# Patient Record
Sex: MALE
[De-identification: names, ages, dates, MRNs, and addresses within clinical notes are randomized per-mention and may not be internally consistent; named-entity substitution may affect disease eponyms.]

---

## 2017-05-19 ENCOUNTER — HOSPITAL ENCOUNTER (EMERGENCY)
Dept: HOSPITAL 14 - H.ER | Age: 27
LOS: 1 days | Discharge: HOME | End: 2017-05-20
Payer: SELF-PAY

## 2017-05-19 DIAGNOSIS — M54.9: Primary | ICD-10-CM

## 2017-05-19 DIAGNOSIS — R31.9: ICD-10-CM

## 2017-05-19 PROCEDURE — 81003 URINALYSIS AUTO W/O SCOPE: CPT

## 2017-05-19 PROCEDURE — 85025 COMPLETE CBC W/AUTO DIFF WBC: CPT

## 2017-05-19 PROCEDURE — 74176 CT ABD & PELVIS W/O CONTRAST: CPT

## 2017-05-19 PROCEDURE — 99282 EMERGENCY DEPT VISIT SF MDM: CPT

## 2017-05-19 PROCEDURE — 96360 HYDRATION IV INFUSION INIT: CPT

## 2017-05-19 PROCEDURE — 87086 URINE CULTURE/COLONY COUNT: CPT

## 2017-05-19 PROCEDURE — 80053 COMPREHEN METABOLIC PANEL: CPT

## 2017-05-20 VITALS
RESPIRATION RATE: 18 BRPM | DIASTOLIC BLOOD PRESSURE: 76 MMHG | TEMPERATURE: 98.3 F | OXYGEN SATURATION: 99 % | HEART RATE: 64 BPM | SYSTOLIC BLOOD PRESSURE: 130 MMHG

## 2017-05-20 LAB
ALBUMIN/GLOB SERPL: 1.6 {RATIO} (ref 1–2.1)
ALP SERPL-CCNC: 77 U/L (ref 38–126)
ALT SERPL-CCNC: 31 U/L (ref 21–72)
AST SERPL-CCNC: 35 U/L (ref 17–59)
BASOPHILS # BLD AUTO: 0.1 K/UL (ref 0–0.2)
BASOPHILS NFR BLD: 0.9 % (ref 0–2)
BILIRUB SERPL-MCNC: 0.3 MG/DL (ref 0.2–1.3)
BILIRUB UR-MCNC: NEGATIVE MG/DL
BUN SERPL-MCNC: 17 MG/DL (ref 9–20)
CALCIUM SERPL-MCNC: 9.4 MG/DL (ref 8.4–10.2)
CHLORIDE SERPL-SCNC: 103 MMOL/L (ref 98–107)
CO2 SERPL-SCNC: 28 MMOL/L (ref 22–30)
COLOR UR: YELLOW
EOSINOPHIL # BLD AUTO: 0.3 K/UL (ref 0–0.7)
EOSINOPHIL NFR BLD: 3.5 % (ref 0–4)
ERYTHROCYTE [DISTWIDTH] IN BLOOD BY AUTOMATED COUNT: 12.7 % (ref 11.5–14.5)
GLOBULIN SER-MCNC: 2.7 GM/DL (ref 2.2–3.9)
GLUCOSE SERPL-MCNC: 108 MG/DL (ref 75–110)
GLUCOSE UR STRIP-MCNC: (no result) MG/DL
HCT VFR BLD CALC: 40.5 % (ref 35–51)
KETONES UR STRIP-MCNC: NEGATIVE MG/DL
LEUKOCYTE ESTERASE UR-ACNC: (no result) LEU/UL
LYMPHOCYTES # BLD AUTO: 2.9 K/UL (ref 1–4.3)
LYMPHOCYTES NFR BLD AUTO: 33.7 % (ref 20–40)
MCH RBC QN AUTO: 28.9 PG (ref 27–31)
MCHC RBC AUTO-ENTMCNC: 34.3 G/DL (ref 33–37)
MCV RBC AUTO: 84.2 FL (ref 80–94)
MONOCYTES # BLD: 0.8 K/UL (ref 0–0.8)
MONOCYTES NFR BLD: 9.4 % (ref 0–10)
NEUTROPHILS # BLD: 4.5 K/UL (ref 1.8–7)
NEUTROPHILS NFR BLD AUTO: 52.5 % (ref 50–75)
NRBC BLD AUTO-RTO: 0 % (ref 0–0)
PH UR STRIP: 5 [PH] (ref 5–8)
PLATELET # BLD: 283 K/UL (ref 130–400)
PMV BLD AUTO: 8 FL (ref 7.2–11.7)
POTASSIUM SERPL-SCNC: 3.6 MMOL/L (ref 3.6–5)
PROT SERPL-MCNC: 7.2 G/DL (ref 6.3–8.2)
PROT UR STRIP-MCNC: NEGATIVE MG/DL
RBC # UR STRIP: NEGATIVE /UL
RBC #/AREA URNS HPF: 5 /HPF (ref 0–3)
SODIUM SERPL-SCNC: 139 MMOL/L (ref 132–148)
SP GR UR STRIP: 1.03 (ref 1–1.03)
UROBILINOGEN UR-MCNC: (no result) MG/DL (ref 0.2–1)
WBC # BLD AUTO: 8.6 K/UL (ref 4.8–10.8)
WBC #/AREA URNS HPF: 7 /HPF (ref 0–5)

## 2017-05-20 NOTE — ED PDOC
HPI: Back


Time Seen by Provider: 17 00:28


Chief Complaint (Nursing): Back Pain


Chief Complaint (Provider): back pain


History Per: Patient


Additional Complaint(s): 


27-year-old male with no past medical history presents to emergency Department 

with pain to lower back that started 2 days ago. Patient denies injury or 

trauma. No fever or chills. No dysuria or hematuria. No medication taken for 

pain relief. 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 

a regular basis. 





Past Medical History


Reviewed: Historical Data, Nursing Documentation, Vital Signs


Vital Signs: 


 Last Vital Signs











Temp  98.3 F   17 00:19


 


Pulse  64   17 00:19


 


Resp  18   17 00:19


 


BP  130/76   17 00:19


 


Pulse Ox  99   17 00:19














- Medical History


PMH: No Chronic Diseases





- Surgical History


Surgical History: No Surg Hx





- Family History


Family History: States: No Known Family Hx





- Living Arrangements


Living Arrangements: With Family





- Social History


Current smoker - smoking cessation education provided: No


Alcohol: None


Drugs: Denies





- Home Medications


Home Medications: 


 Ambulatory Orders











 Medication  Instructions  Recorded


 


Cyclobenzaprine [Cyclobenzaprine 10 mg PO TID PRN #15 tab 17





HCl]  


 


Ibuprofen [Motrin] 600 mg PO Q6 PRN #15 tab 17














- Allergies


Allergies/Adverse Reactions: 


 Allergies











Allergy/AdvReac Type Severity Reaction Status Date / Time


 


No Known Allergies Allergy   Verified 17 00:19














Review of Systems


ROS Statement: Except As Marked, All Systems Reviewed And Found Negative


Constitutional: Negative for: Fever


Cardiovascular: Negative for: Chest Pain


Respiratory: Negative for: Cough


Gastrointestinal: Negative for: Nausea, Vomiting, Abdominal Pain


Genitourinary Male: Negative for: Dysuria, Frequency, Incontinence, Hematuria


Musculoskeletal: Positive for: Back Pain


Neurological: Negative for: Weakness, Numbness, Incoordination, Change in Speech

, Altered Mental Status, Headache, Dizziness





Physical Exam





- Reviewed


Nursing Documentation Reviewed: Yes


Vital Signs Reviewed: Yes





- Physical Exam


Appears: Positive for: Well, Non-toxic, No Acute Distress


Skin: Negative for: Rash


Eye Exam: Positive for: Normal appearance, EOMI, PERRL


Cardiovascular/Chest: Positive for: Regular Rate, Rhythm


Respiratory: Positive for: Normal Breath Sounds.  Negative for: Respiratory 

Distress


Gastrointestinal/Abdominal: Positive for: Normal Exam, Soft.  Negative for: 

Tenderness


Back: Positive for: L CVA Tenderness, R CVA Tenderness, Vertebral Tenderness (

lumbar region), Other (negative bilateral straight leg raise)


Extremity: Positive for: Normal ROM.  Negative for: Pedal Edema


Neurologic/Psych: Positive for: Alert, Oriented





- Laboratory Results


Result Diagrams: 


 17 01:54





 17 01:54


Urine dip results: Positive for: Blood (moderate).  Negative for: Leukocyte 

Esterase, Nitrate, Ketones, Glucose, Bilirubin, Protein





- ECG


O2 Sat by Pulse Oximetry: 99


Pulse Ox Interpretation: Normal





- Other Rad


  ** CT abd and pelvis without contrast


X-Ray: Read By Radiologist


X-Ray Interpretation: no acute finding, see below





Medical Decision Making


Medical Decision Makin year old with back pain





Urine dip shows moderate blood, will order CT to rule out renal pathology





Plan:


CBC


CMP


IVF


IV toradol


CT abd and pelvis without contrast





CT:  IMPRESSION: - No evidence of significant acute process on this unenhanced 

exam. There is no evidence of nephrolithiasis or obstructive uropathy.


- An abnormal, bandlike area of soft tissue is seen in the right ischiorectal 

fossa laterally, which appears to connect with the demond of the penis on the 

right. This is of uncertain etiology, but could represent some type of 

congenital anomaly. It does not have a CT appearance to suggest an abscess or 

other acute inflammatory process. Recommend clinical correlation. Nonemergent 

pelvic MRI may be helpful for further evaluation. 





Patient states pain is resolved after toradol was given.  He states he feels 

much better. CT is negative for acute finding.  Patient aware of incidental 

finding.  All questions answered.  Will d/c with rx motrin and flexeril.  

Patient was referred to clinic for follow up.  





Disposition





- Clinical Impression


Clinical Impression: 


 Back strain








- Patient ED Disposition


Is Patient to be Admitted: No


Counseled Patient/Family Regarding: Studies Performed, Diagnosis, Need For 

Followup, Rx Given





- Disposition


Referrals: 


[facility] [Outside]


Disposition: Routine/Home


Disposition Time: 04:21


Condition: STABLE


Additional Instructions: 


Take rx meds as directed as needed for pain.  Avoid heavy lifting.  Follow up 

in 2-3 days with clinic.  


Prescriptions: 


Cyclobenzaprine [Cyclobenzaprine HCl] 10 mg PO TID PRN #15 tab


 PRN Reason: Muscle Pain


Ibuprofen [Motrin] 600 mg PO Q6 PRN #15 tab


 PRN Reason: Pain, Moderate (4-7)


Instructions:  Back Pain (ED), Muscle Strain (ED)


Print Language: Hebrew





Results





- Lab Results


Lab Results: 














  17





  01:54 01:54 01:54


 


WBC    8.6


 


RBC    4.82


 


Hgb    13.9


 


Hct    40.5


 


MCV    84.2


 


MCH    28.9


 


MCHC    34.3


 


RDW    12.7


 


Plt Count    283


 


MPV    8.0


 


Neut % (Auto)    52.5


 


Lymph % (Auto)    33.7


 


Mono % (Auto)    9.4


 


Eos % (Auto)    3.5


 


Baso % (Auto)    0.9


 


Neut #    4.5


 


Lymph #    2.9


 


Mono #    0.8


 


Eos #    0.3


 


Baso #    0.1


 


Sodium   139 


 


Potassium   3.6 


 


Chloride   103 


 


Carbon Dioxide   28 


 


Anion Gap   12 


 


BUN   17 


 


Creatinine   0.8 


 


Est GFR ( Amer)   > 60 


 


Est GFR (Non-Af Amer)   > 60 


 


Random Glucose   108 


 


Calcium   9.4 


 


Total Bilirubin   0.3 


 


AST   35 


 


ALT   31 


 


Alkaline Phosphatase   77 


 


Total Protein   7.2 


 


Albumin   4.5 


 


Globulin   2.7 


 


Albumin/Globulin Ratio   1.6 


 


Urine Color  Yellow  


 


Urine Clarity  Clear  


 


Urine pH  5.0  


 


Ur Specific Gravity  1.033 H  


 


Urine Protein  Negative  


 


Urine Glucose (UA)  Neg  


 


Urine Ketones  Negative  


 


Urine Blood  Negative  


 


Urine Nitrate  Negative  


 


Urine Bilirubin  Negative  


 


Urine Urobilinogen  0.2-1.0  


 


Ur Leukocyte Esterase  Trace  


 


Urine RBC (Auto)  5 H  


 


Urine Microscopic WBC  7 H

## 2018-06-10 ENCOUNTER — HOSPITAL ENCOUNTER (OUTPATIENT)
Dept: HOSPITAL 14 - H.ER | Age: 28
Setting detail: OBSERVATION
LOS: 1 days | Discharge: HOME | End: 2018-06-11
Attending: INTERNAL MEDICINE | Admitting: INTERNAL MEDICINE
Payer: SELF-PAY

## 2018-06-10 DIAGNOSIS — D72.828: ICD-10-CM

## 2018-06-10 DIAGNOSIS — R51: ICD-10-CM

## 2018-06-10 DIAGNOSIS — R04.0: ICD-10-CM

## 2018-06-10 DIAGNOSIS — R93.0: Primary | ICD-10-CM

## 2018-06-10 PROCEDURE — 99285 EMERGENCY DEPT VISIT HI MDM: CPT

## 2018-06-10 PROCEDURE — 86850 RBC ANTIBODY SCREEN: CPT

## 2018-06-10 PROCEDURE — 85027 COMPLETE CBC AUTOMATED: CPT

## 2018-06-10 PROCEDURE — 70450 CT HEAD/BRAIN W/O DYE: CPT

## 2018-06-10 PROCEDURE — 86900 BLOOD TYPING SEROLOGIC ABO: CPT

## 2018-06-10 PROCEDURE — 85610 PROTHROMBIN TIME: CPT

## 2018-06-10 PROCEDURE — 93005 ELECTROCARDIOGRAM TRACING: CPT

## 2018-06-10 PROCEDURE — 85730 THROMBOPLASTIN TIME PARTIAL: CPT

## 2018-06-10 PROCEDURE — 85025 COMPLETE CBC W/AUTO DIFF WBC: CPT

## 2018-06-10 PROCEDURE — 80053 COMPREHEN METABOLIC PANEL: CPT

## 2018-06-10 PROCEDURE — 70553 MRI BRAIN STEM W/O & W/DYE: CPT

## 2018-06-11 VITALS
HEART RATE: 89 BPM | SYSTOLIC BLOOD PRESSURE: 122 MMHG | DIASTOLIC BLOOD PRESSURE: 82 MMHG | OXYGEN SATURATION: 96 % | TEMPERATURE: 98.2 F

## 2018-06-11 VITALS — RESPIRATION RATE: 20 BRPM

## 2018-06-11 LAB
ALBUMIN SERPL-MCNC: 4 G/DL (ref 3.5–5)
ALBUMIN/GLOB SERPL: 1.2 {RATIO} (ref 1–2.1)
ALT SERPL-CCNC: 48 U/L (ref 21–72)
APTT BLD: 30.5 SECONDS (ref 25.6–37.1)
AST SERPL-CCNC: 38 U/L (ref 17–59)
BASOPHILS # BLD AUTO: 0 K/UL (ref 0–0.2)
BASOPHILS NFR BLD: 0.3 % (ref 0–2)
BUN SERPL-MCNC: 11 MG/DL (ref 9–20)
CALCIUM SERPL-MCNC: 9 MG/DL (ref 8.4–10.2)
EOSINOPHIL # BLD AUTO: 0.1 K/UL (ref 0–0.7)
EOSINOPHIL NFR BLD: 1.2 % (ref 0–4)
ERYTHROCYTE [DISTWIDTH] IN BLOOD BY AUTOMATED COUNT: 12.8 % (ref 11.5–14.5)
ERYTHROCYTE [DISTWIDTH] IN BLOOD BY AUTOMATED COUNT: 13.1 % (ref 11.5–14.5)
GFR NON-AFRICAN AMERICAN: > 60
HGB BLD-MCNC: 14.3 G/DL (ref 12–18)
HGB BLD-MCNC: 14.4 G/DL (ref 12–18)
INR PPP: 1 (ref 0.9–1.2)
LYMPHOCYTES # BLD AUTO: 1.6 K/UL (ref 1–4.3)
LYMPHOCYTES NFR BLD AUTO: 13.4 % (ref 20–40)
MCH RBC QN AUTO: 28.3 PG (ref 27–31)
MCH RBC QN AUTO: 28.4 PG (ref 27–31)
MCHC RBC AUTO-ENTMCNC: 33.2 G/DL (ref 33–37)
MCHC RBC AUTO-ENTMCNC: 33.5 G/DL (ref 33–37)
MCV RBC AUTO: 84.6 FL (ref 80–94)
MCV RBC AUTO: 85.4 FL (ref 80–94)
MONOCYTES # BLD: 1.2 K/UL (ref 0–0.8)
MONOCYTES NFR BLD: 9.8 % (ref 0–10)
NEUTROPHILS # BLD: 9.2 K/UL (ref 1.8–7)
NEUTROPHILS NFR BLD AUTO: 75.3 % (ref 50–75)
NRBC BLD AUTO-RTO: 0 % (ref 0–0)
PLATELET # BLD: 319 K/UL (ref 130–400)
PLATELET # BLD: 323 K/UL (ref 130–400)
PMV BLD AUTO: 7.4 FL (ref 7.2–11.7)
PROTHROMBIN TIME: 10.5 SECONDS (ref 9.8–13.1)
RBC # BLD AUTO: 5.05 MIL/UL (ref 4.4–5.9)
RBC # BLD AUTO: 5.06 MIL/UL (ref 4.4–5.9)
WBC # BLD AUTO: 12.2 K/UL (ref 4.8–10.8)
WBC # BLD AUTO: 13.9 K/UL (ref 4.8–10.8)

## 2018-06-11 NOTE — CP.PCM.DIS
Provider





- Provider


Date of Admission: 


06/11/18 01:09





Attending physician: 


Arnold Coon





Consults: 





Neurosurgery: DR English


Time Spent in preparation of Discharge (in minutes): 30





Diagnosis





- Discharge Diagnosis


(1) Abnormal finding on CT scan


Status: Acute   





(2) Headache


Status: Acute   





Hospital Course





- Lab Results


Lab Results: 


 Most Recent Lab Values











WBC  13.9 K/uL (4.8-10.8)  H  06/11/18  04:20    


 


RBC  5.06 Mil/uL (4.40-5.90)   06/11/18  04:20    


 


Hgb  14.4 g/dL (12.0-18.0)   06/11/18  04:20    


 


Hct  43.2 % (35.0-51.0)   06/11/18  04:20    


 


MCV  85.4 fl (80.0-94.0)   06/11/18  04:20    


 


MCH  28.4 pg (27.0-31.0)   06/11/18  04:20    


 


MCHC  33.2 g/dL (33.0-37.0)   06/11/18  04:20    


 


RDW  13.1 % (11.5-14.5)   06/11/18  04:20    


 


Plt Count  323 K/uL (130-400)   06/11/18  04:20    


 


MPV  7.4 fl (7.2-11.7)   06/11/18  00:52    


 


Neut % (Auto)  75.3 % (50.0-75.0)  H  06/11/18  00:52    


 


Lymph % (Auto)  13.4 % (20.0-40.0)  L  06/11/18  00:52    


 


Mono % (Auto)  9.8 % (0.0-10.0)   06/11/18  00:52    


 


Eos % (Auto)  1.2 % (0.0-4.0)   06/11/18  00:52    


 


Baso % (Auto)  0.3 % (0.0-2.0)   06/11/18  00:52    


 


Neut # (Auto)  9.2 K/uL (1.8-7.0)  H  06/11/18  00:52    


 


Lymph # (Auto)  1.6 K/uL (1.0-4.3)   06/11/18  00:52    


 


Mono # (Auto)  1.2 K/uL (0.0-0.8)  H  06/11/18  00:52    


 


Eos # (Auto)  0.1 K/uL (0.0-0.7)   06/11/18  00:52    


 


Baso # (Auto)  0.0 K/uL (0.0-0.2)   06/11/18  00:52    


 


PT  10.5 Seconds (9.8-13.1)   06/11/18  00:52    


 


INR  1.0  (0.9-1.2)   06/11/18  00:52    


 


APTT  30.5 Seconds (25.6-37.1)   06/11/18  00:52    


 


Sodium  139 mmol/l (132-148)   06/11/18  00:52    


 


Potassium  4.4 MMOL/L (3.6-5.0)   06/11/18  00:52    


 


Chloride  102 mmol/L ()   06/11/18  00:52    


 


Carbon Dioxide  27 mmol/L (22-30)   06/11/18  00:52    


 


Anion Gap  14  (10-20)   06/11/18  00:52    


 


BUN  11 mg/dl (9-20)   06/11/18  00:52    


 


Creatinine  0.8 mg/dl (0.8-1.5)   06/11/18  00:52    


 


Est GFR ( Amer)  > 60   06/11/18  00:52    


 


Est GFR (Non-Af Amer)  > 60   06/11/18  00:52    


 


Random Glucose  110 mg/dL ()   06/11/18  00:52    


 


Calcium  9.0 mg/dL (8.4-10.2)   06/11/18  00:52    


 


Total Bilirubin  0.6 mg/dl (0.2-1.3)   06/11/18  00:52    


 


AST  38 U/L (17-59)   06/11/18  00:52    


 


ALT  48 U/L (21-72)   06/11/18  00:52    


 


Alkaline Phosphatase  114 U/L ()   06/11/18  00:52    


 


Total Protein  7.3 G/DL (6.3-8.2)   06/11/18  00:52    


 


Albumin  4.0 g/dL (3.5-5.0)   06/11/18  00:52    


 


Globulin  3.2 gm/dL (2.2-3.9)   06/11/18  00:52    


 


Albumin/Globulin Ratio  1.2  (1.0-2.1)   06/11/18  00:52    


 


Blood Type  O POSITIVE   06/11/18  00:52    


 


Antibody Screen  Negative   06/11/18  00:52    


 


BBK History Checked  No verified bt   06/11/18  00:52    














- Hospital Course


Hospital Course: 





28 years old  male with no significant past medial Hx, comes with 2 

days of a continued Occipital headache that is continuous. No hx of Trauma, 

Nausea nor vomits. He did have nose bleed twice during the period of the 

headache however this resolved.    No chest pain nor SOB. He refers no nausea, 

vomits, diarrhea nor constipation. No problem with the Vision.   No neurologic 

deficit.  


CT of the Head: - 5 mm round hyperdense lesion in the right occipital lobe. In 

a patient of this age, this could


be secondary to a vascular malformation, such as cavernous malformation or 

developmental


venous anomaly. A tiny focus of acute hemorrhage is not excluded. MRI of the 

brain would be


helpful for further evaluation.


MRI Brain : Medial Right Occipital lobe lesion likely a small granuloma or 

Cavernoma.





#. Headache .


-Observed in Telemetry


- Neuro checks - no abnormal


- no signs of increase ICP


- no focal neuro deficit


-Tylenol for Pain


-Headache resolved


- advised pt to return if sxs recurs or if with fever , neuro deficit





#. Abnormal Head CT


- Dr Wells consulted and requested the MRI


- MRI  with and without contrast: prob Granuloma or Cavernoma


- discussed case with Dr English - including MRI result - cleared pt for 

discharge home





#. Reactive Leukocytis


- pt has no fever, no signs of infection





#. Epistaxis resolved 





#. DVT prophylaxis with SCD





Discharge Exam





- Head Exam


Head Exam: ATRAUMATIC, NORMAL INSPECTION, NORMOCEPHALIC





- Eye Exam


Eye Exam: EOMI, Normal appearance, PERRL


Pupil Exam: NORMAL ACCOMODATION





- ENT Exam


ENT Exam: Mucous Membranes Moist, Normal External Ear Exam





- Neck Exam


Neck exam: Full Rom


Additional comments: 





no nuchal rigidity





- Respiratory Exam


Respiratory Exam: NORMAL BREATHING PATTERN.  absent: Rales, Wheezes, 

Respiratory Distress





- Cardiovascular Exam


Cardiovascular Exam: REGULAR RHYTHM, +S1, +S2





- GI/Abdominal Exam


GI & Abdominal Exam: Normal Bowel Sounds, Soft.  absent: Tenderness





- Extremities Exam


Extremities exam: full ROM, normal capillary refill, pedal pulses present





- Back Exam


Back exam: FULL ROM.  absent: CVA tenderness (L), CVA tenderness (R)





- Neurological Exam


Neurological exam: Alert, CN II-XII Intact, Normal Gait, Oriented x3, Reflexes 

Normal





- Psychiatric Exam


Psychiatric exam: Normal Affect, Normal Mood





- Skin


Skin Exam: Dry, Normal Color, Warm





Discharge Plan





- Discharge Medications


Prescriptions: 


Acetaminophen [Tylenol 325mg tab] 650 mg PO TID PRN #1 tab


 PRN Reason: Headache





- Follow Up Plan


Condition: IMPROVED


Disposition: HOME/ ROUTINE


Instructions:  Headache, Adult (DC)


Additional Instructions: 


hacer claudia en la clinica en reggie semana


Referrals: 


[facility] [place] [Outside]

## 2018-06-11 NOTE — CP.PCM.HP
History of Present Illness





- History of Present Illness


History of Present Illness: 


PMD: none





Chief complaint: Headache





The patient was seen and examined in the ED





HPI: 28 years old  male with no significant past medicl Hx, comes with 

2 days of a continued Occipital headache that is continuous. No hx of Trauma, 

Nausea nor vomits. He did have nose bleed twice during the period of the 

headache. No chest pain nor SOB. He refers no nausea, vomits, diarrhea nor 

constipation. No problem with the Vision.








PMH: No chronic disease





PSH: No surgeries





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 

use; works in construction





FH: States: No Known Family Hx





Allergies: NKDA





Medication: Reviewed











Present on Admission





- Present on Admission


Any Indicators Present on Admission: No


History of DVT/PE: No


History of Uncontrolled Diabetes: No


Urinary Catheter: No


Decubitus Ulcer Present: No





Review of Systems





- Constitutional


Constitutional: Fever, Headache.  absent: Anorexia, Chills





- EENT


Eyes: absent: Blind Spots, Diplopia, Itchy Eyes, Requires Corrective Lenses, 

Sees Flashes


Ears: absent: Decreased Hearing, Ear Discharge, Tinnitus


Nose/Mouth/Throat: Epistaxis, Nasal Congestion





- Cardiovascular


Cardiovascular: absent: Chest Pain, Dyspnea, Edema





- Respiratory


Respiratory: Cough, Excessive Mucous Production.  absent: Dyspnea, Chest 

Congestion





- Gastrointestinal


Gastrointestinal: absent: Constipation, Diarrhea, Nausea, Vomiting





- Genitourinary


Genitourinary: absent: Dysuria, Flank Pain, Hematuria





- Musculoskeletal


Musculoskeletal: absent: Arthralgias, Loss of Height, Muscle Cramps, Myalgias, 

Neck Pain





- Integumentary


Integumentary: absent: Pruritus, Rash, Skin Ulcer, Sores, Striae, Swelling





- Neurological


Neurological: Headaches.  absent: Confusion, Dizziness





- Psychiatric


Psychiatric: absent: Anxiety, Depression, Hopelessness, Paranoia





- Endocrine


Endocrine: absent: Palpitations, Polydipsia, Polyphagia, Polyuria





- Hematologic/Lymphatic


Hematologic: absent: Easy Bleeding, Easy Bruising





Past Patient History





- Past Medical History & Family History


Past Medical History?: No





- Past Social History


Smoking Status: Never Smoked


Chewing Tobacco Use: No


Cigar Use: No


Alcohol: Occasional


Home Situation {Lives}: With Family





- CARDIAC


Hx Cardiac Disorders: No





- PULMONARY


Hx Respiratory Disorders: No





- NEUROLOGICAL


Hx Neurological Disorder: No





- HEENT


Hx HEENT Problems: No





- RENAL


Hx Chronic Kidney Disease: No


Hx Kidney Stones: No





- ENDOCRINE/METABOLIC


Hx Endocrine Disorders: No





- HEMATOLOGICAL/ONCOLOGICAL


Hx Blood Disorders: No





- INTEGUMENTARY


Hx Dermatological Problems: No





- MUSCULOSKELETAL/RHEUMATOLOGICAL


Hx Musculoskeletal Disorders: No





- GASTROINTESTINAL


Hx Gastrointestinal Disorders: No





- PSYCHIATRIC


Hx Substance Use: No





- SURGICAL HISTORY


Hx Surgeries: No





- ANESTHESIA


Hx Anesthesia: No





Meds


Allergies/Adverse Reactions: 


 Allergies











Allergy/AdvReac Type Severity Reaction Status Date / Time


 


No Known Allergies Allergy   Verified 06/11/18 02:28














Physical Exam





- Constitutional


Appears: No Acute Distress





- Head Exam


Head Exam: ATRAUMATIC, NORMAL INSPECTION, NORMOCEPHALIC





- Eye Exam


Eye Exam: EOMI, Normal appearance


Pupil Exam: NORMAL ACCOMODATION, PERRL





- ENT Exam


ENT Exam: Mucous Membranes Moist, Normal Exam, Normal External Ear Exam





- Neck Exam


Neck exam: Positive for: Full Rom, Normal Inspection.  Negative for: 

Lymphadenopathy, Tenderness





- Respiratory Exam


Respiratory Exam: Clear to Auscultation Bilateral.  absent: Rales, Rhonchi, 

Wheezes





- Cardiovascular Exam


Cardiovascular Exam: Gallop, REGULAR RHYTHM, RRR, +S1, +S2.  absent: JVD





- GI/Abdominal Exam


GI & Abdominal Exam: Normal Bowel Sounds, Soft.  absent: Organomegaly, 

Tenderness





- Rectal Exam


Rectal Exam: Deferred





- Extremities Exam


Extremities exam: Positive for: full ROM, normal inspection.  Negative for: 

pedal edema, tenderness





- Back Exam


Back exam: NORMAL INSPECTION.  absent: CVA tenderness (L), CVA tenderness (R)





- Neurological Exam


Neurological exam: Alert, CN II-XII Intact, Oriented x3, Reflexes Normal





- Psychiatric Exam


Psychiatric exam: Normal Affect, Normal Mood





- Skin


Skin Exam: Dry, Intact, Normal Color, Warm





Results





- Vital Signs


Recent Vital Signs: 





 Last Vital Signs











Temp  99.0 F   06/10/18 23:35


 


Pulse  88   06/10/18 23:35


 


Resp  16   06/10/18 23:35


 


BP  145/88   06/10/18 23:35


 


Pulse Ox  99   06/11/18 00:36














- Labs


Result Diagrams: 


 06/11/18 00:52





 06/11/18 00:52


Labs: 





 Laboratory Results - last 24 hr











  06/11/18 06/11/18 06/11/18





  00:52 00:52 00:52


 


WBC  12.2 H  


 


RBC  5.05  


 


Hgb  14.3  


 


Hct  42.7  


 


MCV  84.6  


 


MCH  28.3  


 


MCHC  33.5  


 


RDW  12.8  


 


Plt Count  319  


 


MPV  7.4  


 


Neut % (Auto)  75.3 H  


 


Lymph % (Auto)  13.4 L  


 


Mono % (Auto)  9.8  


 


Eos % (Auto)  1.2  


 


Baso % (Auto)  0.3  


 


Neut # (Auto)  9.2 H  


 


Lymph # (Auto)  1.6  


 


Mono # (Auto)  1.2 H  


 


Eos # (Auto)  0.1  


 


Baso # (Auto)  0.0  


 


PT    10.5


 


INR    1.0


 


APTT    30.5


 


Sodium   139 


 


Potassium   4.4 


 


Chloride   102 


 


Carbon Dioxide   27 


 


Anion Gap   14 


 


BUN   11 


 


Creatinine   0.8 


 


Est GFR ( Amer)   > 60 


 


Est GFR (Non-Af Amer)   > 60 


 


Random Glucose   110 


 


Calcium   9.0 


 


Total Bilirubin   0.6 


 


AST   38 


 


ALT   48 


 


Alkaline Phosphatase   114 


 


Total Protein   7.3 


 


Albumin   4.0 


 


Globulin   3.2 


 


Albumin/Globulin Ratio   1.2 


 


Blood Type   


 


Antibody Screen   


 


BBK History Checked   














  06/11/18





  00:52


 


WBC 


 


RBC 


 


Hgb 


 


Hct 


 


MCV 


 


MCH 


 


MCHC 


 


RDW 


 


Plt Count 


 


MPV 


 


Neut % (Auto) 


 


Lymph % (Auto) 


 


Mono % (Auto) 


 


Eos % (Auto) 


 


Baso % (Auto) 


 


Neut # (Auto) 


 


Lymph # (Auto) 


 


Mono # (Auto) 


 


Eos # (Auto) 


 


Baso # (Auto) 


 


PT 


 


INR 


 


APTT 


 


Sodium 


 


Potassium 


 


Chloride 


 


Carbon Dioxide 


 


Anion Gap 


 


BUN 


 


Creatinine 


 


Est GFR ( Amer) 


 


Est GFR (Non-Af Amer) 


 


Random Glucose 


 


Calcium 


 


Total Bilirubin 


 


AST 


 


ALT 


 


Alkaline Phosphatase 


 


Total Protein 


 


Albumin 


 


Globulin 


 


Albumin/Globulin Ratio 


 


Blood Type  O POSITIVE


 


Antibody Screen  Negative


 


BBK History Checked  No verified bt














- Imaging and Cardiology


  ** CT scan - head


Status: Image reviewed by me


Additional comment: 





Initial Report created on 6/11/2018 12:25 AM Eastern Time ([place] & [place])


EXAM:


CT Head Without Intravenous Contrast





FINDINGS:


BRAIN: Best seen on image 35 of series 602, there is a small 5 x 5 mm round 

focus of hyperdensity


in the right occipital lobe, centered at the corticomedullary junction, which 

has a CT attenuation of 60


Hounsfield units.


Otherwise, no evidence of a significant acute intracranial process. No evidence 

of diffuse intracranial


lesions by CT. No acute extra-axial fluid collections visualized. No evidence 

of significant mass


effect within the brain.


VENTRICLES: No evidence of significant hydrocephalus.


BONES/JOINTS: No acute fractures or other acute bony abnormality noted.


SOFT TISSUES: No acute abnormality of the visualized soft tissues is seen.


SINUSES: Visualized paranasal sinuses appear clear.


MASTOID AIR CELLS: Mastoid air cells appear clear.





IMPRESSION:


- 5 mm round hyperdense lesion in the right occipital lobe. In a patient of 

this age, this could


be secondary to a vascular malformation, such as cavernous malformation or 

developmental


venous anomaly. A tiny focus of acute hemorrhage is not excluded. MRI of the 

brain would be


helpful for further evaluation.


- See above for remaining findings.





Assessment & Plan





- Assessment and Plan (Free Text)


Assessment: 





#. Headache


#. Abnormal Head CT


#. Leukocytois


Plan: 


28 years old  male with no significant past medial Hx, comes with 2 

days of a continued Occipital headache that is continuous. No hx of Trauma, 

Nausea nor vomits. He did have nose bleed twice during the period of the 

headache. No chest pain nor SOB. He refers no nausea, vomits, diarrhea nor 

constipation. No problem with the Vision.





#. Headache at Occipital region could be related to poor vision. CT of head 

with Questionable 5mm round hyperdense lesion at right Occipital lobe.


-Observe in Telemetry


- Neuro checks


-Tylenol for Pain





#. Abnormal Head CT


- Dr Wells consulted and requested the MRI


- MRI  with and without contrast. If negative patient may be discharged with 

pain management





#. Reactive Leukocytois


- follow WBC





#. Epistaxis Stable





#. DVT prophylaxis with SCD





#. Code Status: Full





- Date & Time


Date: 06/11/18


Time: 02:08

## 2018-06-11 NOTE — ED PDOC
HPI:  Headache


Time Seen by Provider: 06/11/18 00:04


Chief Complaint (Nursing): Headache


Chief Complaint (Provider): HEADACHE


History Per: Patient (27 Y/O MALE HERE WITH HEADACHE NOTED YESTERDAY AND TODAY.

  PATIENT NOTES NOSEBLEED TODAY AND IS CONCERNED.)





Past Medical History


Reviewed: Historical Data, Nursing Documentation, Vital Signs


Vital Signs: 





 Last Vital Signs











Temp  99.0 F   06/10/18 23:35


 


Pulse  88   06/10/18 23:35


 


Resp  16   06/10/18 23:35


 


BP  145/88   06/10/18 23:35


 


Pulse Ox  99   06/10/18 23:35














- Family History


Family History: States: No Known Family Hx





- Home Medications


Home Medications: 


 Ambulatory Orders











 Medication  Instructions  Recorded


 


Cyclobenzaprine [Cyclobenzaprine 10 mg PO TID PRN #15 tab 05/20/17





HCl]  


 


Ibuprofen [Motrin] 600 mg PO Q6 PRN #15 tab 05/20/17














- Allergies


Allergies/Adverse Reactions: 


 Allergies











Allergy/AdvReac Type Severity Reaction Status Date / Time


 


No Known Allergies Allergy   Verified 05/20/17 00:19














Review of Systems


ROS Statement: Except As Marked, All Systems Reviewed And Found Negative





Physical Exam





- Reviewed


Nursing Documentation Reviewed: Yes


Vital Signs Reviewed: Yes





- Physical Exam


Appears: Positive for: Well, Non-toxic, No Acute Distress


Head Exam: Positive for: ATRAUMATIC, NORMAL INSPECTION, NORMOCEPHALIC


Skin: Positive for: Normal Color, Warm, DRY


Eye Exam: Positive for: EOMI, Normal appearance, PERRL


ENT: Positive for: Normal ENT Inspection


Neck: Positive for: Normal, Painless ROM


Cardiovascular/Chest: Positive for: Regular Rate, Rhythm


Respiratory: Positive for: CNT, Normal Breath Sounds


Gastrointestinal/Abdominal: Positive for: Normal Exam, Soft


Back: Positive for: Normal Inspection


Extremity: Positive for: Normal ROM


Neurologic/Psych: Positive for: Alert, Oriented





- Laboratory Results


Result Diagrams: 


 06/11/18 00:52





 06/11/18 00:52





- ECG


O2 Sat by Pulse Oximetry: 99





- Progress


ED Course And Treament: 








D/W [facility] RADIOLOGIST ABNORMAL CT FINDINGS:





 IMPRESSION:       


 - 5 mm round hyperdense lesion in the right occipital lobe. In a patient of   


 this age, this could be secondary to a vascular malformation, such as 

cavernous   


 malformation or developmental venous anomaly. A tiny focus of acute hemorrhage

   


 is not excluded. MRI of the brain would be helpful for further evaluation.  


 - See above for remaining findings.  


 


                                                            Dictated By:  

China Bianchi MD      


                                                            Dictated Date/Time:

  06/11/18 0025  


                                                            Signed By: China Bianchi MD  


                                                            Date Signed: 06/11/ 18 0025  


                                                Transcribed By: YOCASTA  


                                                            Transcribe Date/Time

: 06/11/18 0025  








d/w Dr. English at 00:40. No immediate intervention recommended.





d/w Dr. Coon.  Will admit for MRI in am.





Disposition





- Clinical Impression


Clinical Impression: 


 Abnormal finding on CT scan








- Patient ED Disposition


Is Patient to be Admitted: Yes





- Disposition


Disposition Time: 01:09


Condition: FAIR





- Pt Status Changed To:


Hospital Disposition Of: Observation

## 2018-06-11 NOTE — MRI
PROCEDURE:  MRI BRAIN WITH AND WITHOUT CONTRAST



HISTORY:

Abnormal CT head



COMPARISON:

Noncontrast head CT 06/10/2018. 



TECHNIQUE:

Multiplanar, multisequence MR images of the brain were obtained with 

(Omniscan 15 cc) and without intravenous contrast enhancement.



FINDINGS:



HEMORRHAGE:

None



DWI:

No evidence of an acute or early subacute infarction.



BRAIN PARENCHYMA:

Corresponding to the tiny area of hyper density at the medial right 

occipital lobe in CT 06/10/2018, is in area of diminished long TR and 

echo planar signal, isointense on T1 weighted imaging.  It is not 

visualized on T1 weighted imaging and is therefore neutral in signal 

intensity on T1 weighting. Following gadolinium administration, there 

is no abnormal intracranial enhancement appreciated throughout the 

examination. Given that echo planar imaging is sensitive to this 

finding and it is dark on T2 weighted imaging, this probably or 

represents a small granuloma or other calcification. It is not felt 

to represent hemosiderin and as it is a minimal finding on the 

gradient echo axial series as compared to the echo planar series. 

Trace hemosiderin is still a possibility and in a small cavernoma.



Remaining signal intensity throughout the gray and white matter 

anatomy above and below the tentorium including the brainstem is 

normal.  There is no mass effect or parenchymal edema appreciated. 

Sulci and cisterns appear normal in overall volume and there is no 

suspicious extra-axial fluid collection.  The midline brain anatomy 

is within normal limits.



ENHANCEMENT:

No abnormal intracranial enhancement.



VENTRICLES:

Unremarkable. No hydrocephalus.



CRANIUM:

Unremarkable.



ORBITS:

Grossly unremarkable.



PARANASAL SINUSES/MASTOIDS:

A left maxillary sinus retention cyst is identified.



VASCULAR SYSTEM:

Skull base flow voids intact.



OTHER FINDINGS:

None .



IMPRESSION:

1. Medial right occipital lobe lesion likely represents a small 

granuloma or cavernoma. No abnormal intracranial enhancement is 

identified. Please see discussion above. 



2. Remainder the brain appears within normal limits. 



3. Incidental small left maxillary sinus retention cyst.

## 2018-06-12 NOTE — CARD
--------------- APPROVED REPORT --------------





EKG Measurement

Heart [id number]

CO [id number]

[id number]

[id number]

[id number]



<Conclusion>

Normal sinus rhythm

Incomplete right bundle branch block

Borderline ECG